# Patient Record
Sex: MALE | Race: OTHER | HISPANIC OR LATINO | Employment: OTHER | URBAN - METROPOLITAN AREA
[De-identification: names, ages, dates, MRNs, and addresses within clinical notes are randomized per-mention and may not be internally consistent; named-entity substitution may affect disease eponyms.]

---

## 2023-04-20 ENCOUNTER — HOSPITAL ENCOUNTER (EMERGENCY)
Facility: HOSPITAL | Age: 38
Discharge: HOME | End: 2023-04-20
Attending: EMERGENCY MEDICINE

## 2023-04-20 ENCOUNTER — APPOINTMENT (EMERGENCY)
Dept: RADIOLOGY | Facility: HOSPITAL | Age: 38
End: 2023-04-20

## 2023-04-20 VITALS
BODY MASS INDEX: 28.17 KG/M2 | HEIGHT: 64 IN | DIASTOLIC BLOOD PRESSURE: 70 MMHG | OXYGEN SATURATION: 99 % | SYSTOLIC BLOOD PRESSURE: 138 MMHG | WEIGHT: 165 LBS | TEMPERATURE: 98.2 F | HEART RATE: 82 BPM | RESPIRATION RATE: 18 BRPM

## 2023-04-20 DIAGNOSIS — M76.60 ACHILLES TENDONITIS: Primary | ICD-10-CM

## 2023-04-20 DIAGNOSIS — M77.30 HEEL SPUR: ICD-10-CM

## 2023-04-20 RX ORDER — NAPROXEN 500 MG/1
500 TABLET ORAL ONCE
Status: COMPLETED | OUTPATIENT
Start: 2023-04-20 | End: 2023-04-20

## 2023-04-20 RX ORDER — NAPROXEN 500 MG/1
500 TABLET ORAL 2 TIMES DAILY WITH MEALS
Qty: 14 TABLET | Refills: 0 | Status: SHIPPED | OUTPATIENT
Start: 2023-04-20 | End: 2023-04-27

## 2023-04-20 RX ADMIN — NAPROXEN 500 MG: 500 TABLET ORAL at 11:52

## 2023-04-20 NOTE — ED PROVIDER NOTES
History  Chief Complaint   Patient presents with   • Ankle Pain     States started yesterday with pain posterior aspect of left ankle, states cannot bear weight today      33-year-old male presenting today for evaluation for left-sided heel pain that began yesterday gradually, did not have any falls or injuries, ambulating with worsening pain  States that when he is not moving he has 0 pain  Pain is located at the Achilles tendon and the Achilles insertion site  Patient has not been on any recent antibiotics  Denies calf pain or swelling, numbness or paresthesias  Has not had any change in shoewear  No other joint pain  None       History reviewed  No pertinent past medical history  History reviewed  No pertinent surgical history  History reviewed  No pertinent family history  I have reviewed and agree with the history as documented  E-Cigarette/Vaping   • E-Cigarette Use Never User      E-Cigarette/Vaping Substances     Social History     Tobacco Use   • Smoking status: Some Days     Types: Cigarettes   • Smokeless tobacco: Never   Vaping Use   • Vaping Use: Never used   Substance Use Topics   • Alcohol use: Yes     Comment: weekends   • Drug use: Never       Review of Systems   Constitutional: Negative  Negative for chills, fatigue and fever  HENT: Negative  Negative for congestion, postnasal drip, rhinorrhea and sore throat  Eyes: Negative  Respiratory: Negative  Negative for cough, shortness of breath and wheezing  Cardiovascular: Negative  Gastrointestinal: Negative  Negative for abdominal pain, diarrhea, nausea and vomiting  Endocrine: Negative  Genitourinary: Negative  Musculoskeletal: Positive for arthralgias  Negative for back pain, gait problem, joint swelling, myalgias, neck pain and neck stiffness  Skin: Negative  Neurological: Negative  Hematological: Negative  Psychiatric/Behavioral: Negative      All other systems reviewed and are negative  Physical Exam  Physical Exam  Vitals and nursing note reviewed  Constitutional:       Appearance: Normal appearance  HENT:      Head: Normocephalic and atraumatic  Right Ear: External ear normal       Left Ear: External ear normal       Nose: Nose normal    Eyes:      Conjunctiva/sclera: Conjunctivae normal    Cardiovascular:      Rate and Rhythm: Normal rate  Pulses: Normal pulses  Pulmonary:      Effort: Pulmonary effort is normal    Abdominal:      General: There is no distension  Musculoskeletal:         General: No deformity  Normal range of motion  Cervical back: Normal range of motion  Legs:    Skin:     General: Skin is warm and dry  Capillary Refill: Capillary refill takes less than 2 seconds  Findings: No rash  Neurological:      General: No focal deficit present  Mental Status: He is alert and oriented to person, place, and time  Mental status is at baseline  Psychiatric:         Mood and Affect: Mood normal          Behavior: Behavior normal          Thought Content:  Thought content normal          Judgment: Judgment normal          Vital Signs  ED Triage Vitals [04/20/23 1131]   Temperature Pulse Respirations Blood Pressure SpO2   98 2 °F (36 8 °C) 82 18 138/70 99 %      Temp Source Heart Rate Source Patient Position - Orthostatic VS BP Location FiO2 (%)   Tympanic Monitor Sitting Left arm --      Pain Score       10 - Worst Possible Pain           Vitals:    04/20/23 1131   BP: 138/70   Pulse: 82   Patient Position - Orthostatic VS: Sitting         Visual Acuity      ED Medications  Medications   naproxen (NAPROSYN) tablet 500 mg (500 mg Oral Given 4/20/23 1152)       Diagnostic Studies  Results Reviewed     None                 XR heel / calcaneus 2+ views LEFT   ED Interpretation by Jyothi Almanza PA-C (04/20 1229)   Heel spur                  Procedures  Procedures         ED Course                               SBIRT 22yo+ Flowsheet Row Most Recent Value   Initial Alcohol Screen: US AUDIT-C     1  How often do you have a drink containing alcohol? 3 Filed at: 04/20/2023 1135   2  How many drinks containing alcohol do you have on a typical day you are drinking? 4 Filed at: 04/20/2023 1135   3a  Male UNDER 65: How often do you have five or more drinks on one occasion? 3 Filed at: 04/20/2023 1135   Audit-C Score 10 Filed at: 04/20/2023 1135   Full Alcohol Screen: US AUDIT    4  How often during the last year have you found that you were not able to stop drinking once you had started? 0 Filed at: 04/20/2023 1135   5  How often during past year have you failed to do what was normally expected of you because of drinking? 0 Filed at: 04/20/2023 1135   6  How often in past year have you needed a first drink in the morning to get yourself going after a heavy drinking session? 0 Filed at: 04/20/2023 1135   7  How often in past year have you had feeling of guilt or remorse after drinking? 0 Filed at: 04/20/2023 1135   8  How often in past year have you been unable to remember what happened night before because you had been drinking? 0 Filed at: 04/20/2023 1135   9  Have you or someone else been injured as a result of your drinking? 0 Filed at: 04/20/2023 1135   10  Has a relative, friend, doctor or other health worker been concerned about your drinking and suggested you cut down?  0 Filed at: 04/20/2023 1135   AUDIT Total Score 10 Filed at: 04/20/2023 1135   VALERIO: How many times in the past year have you    Used an illegal drug or used a prescription medication for non-medical reasons? Never Filed at: 04/20/2023 1135                    Medical Decision Making  Suspect achilles tendonitis with heel spur  Patient is informed to return to the emergency department for worsening of symptoms and was given proper education regarding their diagnosis and symptoms   Otherwise the patient is informed to follow up with their primary care doctor/podiatry for re-evaluation  The patient verbalizes understanding and agrees with above assessment and plan  All questions were answered  Achilles tendonitis: acute illness or injury  Heel spur: chronic illness or injury  Amount and/or Complexity of Data Reviewed  Radiology: ordered and independent interpretation performed  Risk  Prescription drug management  Disposition  Final diagnoses:   Achilles tendonitis   Heel spur     Time reflects when diagnosis was documented in both MDM as applicable and the Disposition within this note     Time User Action Codes Description Comment    4/20/2023 12:29 PM Dyane Dent Add [M76 60] Achilles tendonitis     4/20/2023 12:29 PM Dyane Conejos Add [S26 88] Heel spur       ED Disposition     ED Disposition   Discharge    Condition   Stable    Date/Time   Thu Apr 20, 2023 12:29 PM    [address] discharge to home/self care  Follow-up Information     Follow up With Specialties Details Why Contact Info Additional [address] Emergency Department Emergency Medicine Go to  If symptoms worsen [address] [address]  [address] [address] Emergency Department, [place], [place], [address] [place] [address] Podiatry Schedule an appointment as soon as possible for a visit  As needed, if symptoms persist  [facility] [address] [phone]             Patient's Medications   Discharge Prescriptions    NAPROXEN (NAPROSYN) 500 MG TABLET    Take 1 tablet (500 mg total) by mouth 2 (two) times a day with meals for 7 days       Start Date: 4/20/2023 End Date: 4/27/2023       Order Dose: 500 mg       Quantity: 14 tablet    Refills: 0       No discharge procedures on file      PDMP Review     None          ED Provider  Electronically Signed by           Jyothi Almanza PA-C  04/20/23 6642

## 2023-04-20 NOTE — Clinical Note
Cecil Suarez was seen and treated in our emergency department on 4/20/2023  Diagnosis:     Brent Zhou  may return to work on return date  He may return on this date: 04/23/2023         If you have any questions or concerns, please don't hesitate to call        Annamaria Edge PA-C    ______________________________           _______________          _______________  Hospital Representative                              Date                                Time

## 2023-08-12 ENCOUNTER — APPOINTMENT (EMERGENCY)
Dept: RADIOLOGY | Facility: HOSPITAL | Age: 38
End: 2023-08-12
Payer: COMMERCIAL

## 2023-08-12 ENCOUNTER — HOSPITAL ENCOUNTER (EMERGENCY)
Facility: HOSPITAL | Age: 38
Discharge: HOME | End: 2023-08-12
Attending: EMERGENCY MEDICINE | Admitting: EMERGENCY MEDICINE
Payer: COMMERCIAL

## 2023-08-12 VITALS
HEART RATE: 69 BPM | WEIGHT: 168 LBS | DIASTOLIC BLOOD PRESSURE: 75 MMHG | OXYGEN SATURATION: 98 % | BODY MASS INDEX: 28.84 KG/M2 | TEMPERATURE: 98.2 F | RESPIRATION RATE: 18 BRPM | SYSTOLIC BLOOD PRESSURE: 123 MMHG

## 2023-08-12 DIAGNOSIS — S83.90XA KNEE SPRAIN: Primary | ICD-10-CM

## 2023-08-12 PROCEDURE — 99283 EMERGENCY DEPT VISIT LOW MDM: CPT

## 2023-08-12 PROCEDURE — NC001 PR NO CHARGE: Performed by: EMERGENCY MEDICINE

## 2023-08-12 PROCEDURE — 73564 X-RAY EXAM KNEE 4 OR MORE: CPT

## 2023-08-12 RX ORDER — NAPROXEN 500 MG/1
500 TABLET ORAL 2 TIMES DAILY WITH MEALS
Qty: 14 TABLET | Refills: 0 | Status: SHIPPED | OUTPATIENT
Start: 2023-08-12

## 2023-08-12 RX ORDER — NAPROXEN 500 MG/1
500 TABLET ORAL ONCE
Status: COMPLETED | OUTPATIENT
Start: 2023-08-12 | End: 2023-08-12

## 2023-08-12 RX ADMIN — NAPROXEN 500 MG: 500 TABLET ORAL at 21:10

## 2023-08-13 NOTE — ED PROVIDER NOTES
History  Chief Complaint   Patient presents with   • Knee Injury     States he twisted his left knee going down steps a week ago. Pain medial aspect      35 yo male twisted left knee a week ago on step. He did not fall to the ground, no head injury or associated symptoms. He c/o left knee pain. No recent illness. History provided by:  Patient   used: No        None       History reviewed. No pertinent past medical history. History reviewed. No pertinent surgical history. History reviewed. No pertinent family history. I have reviewed and agree with the history as documented. E-Cigarette/Vaping   • E-Cigarette Use Never User      E-Cigarette/Vaping Substances     Social History     Tobacco Use   • Smoking status: Some Days     Types: Cigarettes   • Smokeless tobacco: Never   Vaping Use   • Vaping Use: Never used   Substance Use Topics   • Alcohol use: Yes     Comment: weekends   • Drug use: Never       Review of Systems   Constitutional: Negative for fever. Respiratory: Negative for cough. Gastrointestinal: Negative for diarrhea and vomiting. Musculoskeletal:        Left knee pain       Physical Exam  Physical Exam  Vitals and nursing note reviewed. Constitutional:       General: He is not in acute distress. Appearance: He is not ill-appearing. Pulmonary:      Effort: Pulmonary effort is normal. No respiratory distress. Musculoskeletal:         General: Tenderness present. No swelling or deformity. Normal range of motion. Cervical back: Normal range of motion and neck supple. Right lower leg: No edema. Left lower leg: No edema. Comments: Left knee + medial joint line tenderness, straight leg raise intact, flex/ext intact. No sts/effusion. DP palp. Neurological:      General: No focal deficit present. Mental Status: He is alert and oriented to person, place, and time.    Psychiatric:         Mood and Affect: Mood normal. Behavior: Behavior normal.         Vital Signs  ED Triage Vitals [08/12/23 1954]   Temperature Pulse Respirations Blood Pressure SpO2   98.2 °F (36.8 °C) 69 18 123/75 98 %      Temp Source Heart Rate Source Patient Position - Orthostatic VS BP Location FiO2 (%)   Tympanic Monitor Sitting Left arm --      Pain Score       8           Vitals:    08/12/23 1954   BP: 123/75   Pulse: 69   Patient Position - Orthostatic VS: Sitting         Visual Acuity      ED Medications  Medications   naproxen (NAPROSYN) tablet 500 mg (500 mg Oral Given 8/12/23 2110)       Diagnostic Studies  Results Reviewed     None                 XR knee 4+ views LEFT   ED Interpretation by Lori Enriquez MD (61/09 5712)   negative                 Procedures  Procedures         ED Course                               SBIRT 22yo+    Flowsheet Row Most Recent Value   Initial Alcohol Screen: US AUDIT-C     1. How often do you have a drink containing alcohol? 2 Filed at: 08/12/2023 1955   2. How many drinks containing alcohol do you have on a typical day you are drinking? 4 Filed at: 08/12/2023 1955   3a. Male UNDER 65: How often do you have five or more drinks on one occasion? 0 Filed at: 08/12/2023 1955   Audit-C Score 6 Filed at: 08/12/2023 1955   VALERIO: How many times in the past year have you. .. Used an illegal drug or used a prescription medication for non-medical reasons? Never Filed at: 08/12/2023 1955                    Medical Decision Making  Xrays negative. Advised ace wrap or sleeve, course of naprosyn and follow up outpt. Ortho. Amount and/or Complexity of Data Reviewed  Radiology: ordered and independent interpretation performed. Risk  Prescription drug management.           Disposition  Final diagnoses:   Knee sprain     Time reflects when diagnosis was documented in both MDM as applicable and the Disposition within this note     Time User Action Codes Description Comment    8/60/1623  4:14 PM Lori STONE Add [A39.36ES] Knee sprain       ED Disposition     ED Disposition   Discharge    Condition   Stable    Date/Time   Sat Aug 12, 2023  9:05 PM    Comment   Milan Berumen discharge to home/self care. Follow-up Information     Follow up With Specialties Details Why [address], DO Orthopedic Surgery Schedule an appointment as soon as possible for a visit   [address] [place] [address], [facility] [address] [place] [address]  [phone]            Discharge Medication List as of 8/12/2023  9:05 PM      START taking these medications    Details   naproxen (NAPROSYN) 500 mg tablet Take 1 tablet (500 mg total) by mouth 2 (two) times a day with meals, Starting Sat 8/12/2023, Normal             No discharge procedures on file.     PDMP Review     None          ED Provider  Electronically Signed by           Rashaad Muprhy MD  01/36/04 5335

## 2023-08-31 VITALS
BODY MASS INDEX: 28.75 KG/M2 | SYSTOLIC BLOOD PRESSURE: 114 MMHG | HEART RATE: 70 BPM | WEIGHT: 168.4 LBS | HEIGHT: 64 IN | DIASTOLIC BLOOD PRESSURE: 74 MMHG

## 2023-08-31 DIAGNOSIS — M25.562 ACUTE PAIN OF LEFT KNEE: Primary | ICD-10-CM

## 2023-08-31 PROCEDURE — 99203 OFFICE O/P NEW LOW 30 MIN: CPT | Performed by: ORTHOPAEDIC SURGERY

## 2023-08-31 NOTE — PROGRESS NOTES
Assessment/Plan:  1. Acute pain of left knee          Scribe Attestation    I,:  Racquel Reyes MA am acting as a scribe while in the presence of the attending physician.:       I,:   March, DO personally performed the services described in this documentation    as scribed in my presence.:         78-year-old male who presents to the office today for evaluation of left knee pain. After thorough history, clinical exam and reviewing his imaging I discussed with him that his signs and symptoms are consistent with acute left knee pain secondary to overuse. X-rays were reviewed in the office today which demonstrate no significant degenerative changes. Overall, the patients pain has been improving and should continue to improve. We discussed a prescription for a daily anti-inflammatory however, the patient declined and would like to take OTC medications as needed. He may continue with activities as tolerated. He may follow up with me as needed. Subjective: left knee pain    Patient ID: Marina Tan is a 34 y.o. male who presents to the office today for evaluation of left knee pain. The patient states a little over a month ago he twisted his knee while going down the steps. He was experiencing pain to the medial aspect of his knee. The patient presented to the ED on 8/12/23 where x-rays were taken and he was prescribed Naproxen which he competed. The patient states his pain has improved. He denies any pain today. He notes occ mild pain to the medial aspect of his knee which is increased with prolonged ambulation. He states his pain is a 2 out of 10 on the pain scale. He denies any instability. He denies any prior surgery. Review of Systems   Constitutional: Positive for activity change. Negative for chills and fever. HENT: Negative for drooling and sneezing. Eyes: Negative for redness. Respiratory: Negative for cough and wheezing. Gastrointestinal: Negative for nausea and vomiting. Musculoskeletal: Positive for arthralgias. Negative for joint swelling and myalgias. Neurological: Negative for weakness and numbness. Psychiatric/Behavioral: Negative for behavioral problems. The patient is not nervous/anxious. History reviewed. No pertinent past medical history. History reviewed. No pertinent surgical history. History reviewed. No pertinent family history. Social History     Occupational History   • Not on file   Tobacco Use   • Smoking status: Some Days     Types: Cigarettes   • Smokeless tobacco: Never   Vaping Use   • Vaping Use: Never used   Substance and Sexual Activity   • Alcohol use: Yes     Comment: weekends   • Drug use: Never   • Sexual activity: Not on file         Current Outpatient Medications:   •  naproxen (NAPROSYN) 500 mg tablet, Take 1 tablet (500 mg total) by mouth 2 (two) times a day with meals, Disp: 14 tablet, Rfl: 0    No Known Allergies    Objective:  Vitals:    08/31/23 0947   BP: 114/74   Pulse: 70       Body mass index is 28.91 kg/m². Left Knee Exam     Tenderness   The patient is experiencing no tenderness. Range of Motion   Extension: 0   Flexion: 120     Tests   Singh:  Medial - negative Lateral - negative  Drawer:  Anterior - negative     Posterior - negative    Other   Erythema: absent  Sensation: normal  Pulse: present  Swelling: none  Effusion: no effusion present    Comments:  Knee is stable at 0, 30 and 90  + peripatellar crepitus  - patellofemoral grind  No erythema  No warmth  No effusion           Observations   Left Knee   Negative for effusion. Physical Exam  Vitals and nursing note reviewed. Constitutional:       Appearance: Normal appearance. He is well-developed. HENT:      Head: Normocephalic and atraumatic. Nose: Nose normal.   Eyes:      General:         Right eye: No discharge. Left eye: No discharge. Extraocular Movements: Extraocular movements intact.       Conjunctiva/sclera: Conjunctivae normal.   Cardiovascular:      Rate and Rhythm: Normal rate. Pulmonary:      Effort: Pulmonary effort is normal. No respiratory distress. Musculoskeletal:      Cervical back: Normal range of motion and neck supple. Left knee: No effusion. Instability Tests: Medial Singh test negative and lateral Singh test negative. Comments: As noted in HPI   Skin:     General: Skin is warm and dry. Neurological:      Mental Status: He is alert and oriented to person, place, and time. Psychiatric:         Behavior: Behavior normal.         Thought Content: Thought content normal.         Judgment: Judgment normal.         I have personally reviewed pertinent films in PACS. X-rays left knee performed on 8/12/23 demonstrate no fractures or dislocations. No significant degenerative changes. This document was created using speech voice recognition software. Grammatical errors, random word insertions, pronoun errors, and incomplete sentences are an occasional consequence of this system due to software limitations, ambient noise, and hardware issues. Any formal questions or concerns about content, text, or information contained within the body of this dictation should be directly addressed to the provider for clarification.

## 2023-09-12 ENCOUNTER — APPOINTMENT (OUTPATIENT)
Dept: LAB | Facility: HOSPITAL | Age: 38
End: 2023-09-12
Attending: SPECIALIST
Payer: COMMERCIAL

## 2023-09-12 DIAGNOSIS — R97.20 ELEVATED PROSTATE SPECIFIC ANTIGEN (PSA): ICD-10-CM

## 2023-09-12 DIAGNOSIS — R35.1 NOCTURIA: ICD-10-CM

## 2023-09-12 LAB — PSA SERPL-MCNC: 0.37 NG/ML (ref 0–4)

## 2023-09-12 PROCEDURE — 36415 COLL VENOUS BLD VENIPUNCTURE: CPT

## 2023-09-12 PROCEDURE — G0103 PSA SCREENING: HCPCS
